# Patient Record
Sex: FEMALE | Employment: OTHER | URBAN - NONMETROPOLITAN AREA
[De-identification: names, ages, dates, MRNs, and addresses within clinical notes are randomized per-mention and may not be internally consistent; named-entity substitution may affect disease eponyms.]

---

## 2017-06-22 ENCOUNTER — ANTICOAGULATION THERAPY VISIT (OUTPATIENT)
Dept: ANTICOAGULATION | Facility: OTHER | Age: 77
End: 2017-06-22

## 2017-06-22 DIAGNOSIS — Z96.649 AFTERCARE FOLLOWING HIP JOINT REPLACEMENT SURGERY: Primary | ICD-10-CM

## 2017-06-22 DIAGNOSIS — Z79.01 LONG-TERM (CURRENT) USE OF ANTICOAGULANTS: ICD-10-CM

## 2017-06-22 DIAGNOSIS — Z47.1 AFTERCARE FOLLOWING HIP JOINT REPLACEMENT SURGERY: Primary | ICD-10-CM

## 2017-06-22 LAB — INR PPP: 1.9

## 2017-06-22 RX ORDER — ROPINIROLE 0.25 MG/1
1 TABLET, FILM COATED ORAL DAILY
COMMUNITY

## 2017-06-22 RX ORDER — FUROSEMIDE 20 MG
20 TABLET ORAL 2 TIMES DAILY
COMMUNITY

## 2017-06-22 RX ORDER — BUDESONIDE AND FORMOTEROL FUMARATE DIHYDRATE 160; 4.5 UG/1; UG/1
2 AEROSOL RESPIRATORY (INHALATION) 2 TIMES DAILY
COMMUNITY

## 2017-06-22 RX ORDER — WARFARIN SODIUM 2.5 MG/1
2.5 TABLET ORAL DAILY
COMMUNITY

## 2017-06-22 RX ORDER — FOSINOPRIL SODIUM 40 MG/1
40 TABLET ORAL DAILY
COMMUNITY

## 2017-06-22 RX ORDER — TRAMADOL HYDROCHLORIDE 50 MG/1
1 TABLET ORAL EVERY 6 HOURS PRN
COMMUNITY

## 2017-06-22 RX ORDER — ASPIRIN 81 MG/1
81 TABLET, CHEWABLE ORAL DAILY
COMMUNITY

## 2017-06-22 RX ORDER — SENNA AND DOCUSATE SODIUM 50; 8.6 MG/1; MG/1
1 TABLET, FILM COATED ORAL 2 TIMES DAILY
COMMUNITY

## 2017-06-22 RX ORDER — DIGOXIN 125 MCG
125 TABLET ORAL DAILY
COMMUNITY

## 2017-06-22 RX ORDER — CARVEDILOL 25 MG/1
25 TABLET ORAL 2 TIMES DAILY WITH MEALS
COMMUNITY

## 2017-06-22 RX ORDER — PRAVASTATIN SODIUM 10 MG
1 TABLET ORAL DAILY
COMMUNITY

## 2017-06-22 NOTE — PROGRESS NOTES
ANTICOAGULATION FOLLOW-UP CLINIC VISIT    Patient Name:  Saritha Montanez  Date:  6/22/2017  Contact Type:  INR done by [facility] staff and result faxed to warfarin clinic. New warfarin dosing and INR recheck date  faxed back to LT facility. Staff to notify warfarin clinic if patient has any bleeding/bruising, changes in diet/meds/activity or questions.    SUBJECTIVE:     Patient Findings     Comments Patient new to LT following hip replacement surgery. Per [facility] nurse, Dr. Rama Vernon is patient provider while she is in LT facility.            OBJECTIVE    INR   Date Value Ref Range Status   06/22/2017 1.9  Final       ASSESSMENT / PLAN  INR assessment THER    Recheck INR In: 1 WEEK    INR Location [facility]      Anticoagulation Summary as of 6/22/2017     INR goal 2.0-3.0   Today's INR 1.9!   Maintenance plan 5 mg (5 mg x 1) on Mon, Wed, Fri; 2.5 mg (5 mg x 0.5) all other days   Full instructions 5 mg on Mon, Wed, Fri; 2.5 mg all other days   Weekly total 25 mg   Plan last modified Marisol Simms RN (6/22/2017)   Next INR check 6/29/2017   Target end date     Indications   Aftercare following joint replacement [Z47.1] [Z47.1]  Long-term (current) use of anticoagulants [Z79.01] [Z79.01]         Anticoagulation Episode Summary     INR check location     Preferred lab     Send INR reminders to [facility] POOL    Comments       Anticoagulation Care Providers     Provider Role Specialty Phone number    Mark Ontiveros MD [facility] [phone]            See the Encounter Report to view Anticoagulation Flowsheet and Dosing Calendar (Go to Encounters tab in chart review, and find the Anticoagulation Therapy Visit)        Marisol Simms, RN

## 2017-06-22 NOTE — MR AVS SNAPSHOT
Saritha KNUTSON Lisseth   6/22/2017   Anticoagulation Therapy Visit    Description:  77 year old female   Provider:  Mark Ontiveros MD   Department:  [facility]           INR as of 6/22/2017     Today's INR 1.9!      Anticoagulation Summary as of 6/22/2017     INR goal 2.0-3.0   Today's INR 1.9!   Full instructions 5 mg on Mon, Wed, Fri; 2.5 mg all other days   Next INR check 6/29/2017    Indications   Aftercare following joint replacement [Z47.1] [Z47.1]  Long-term (current) use of anticoagulants [Z79.01] [Z79.01]         June 2017 Details    Sun Mon Tue Wed Thu Fri Sat         1               2               3                 4               5               6               7               8               9               10                 11               12               13               14               15               16               17                 18               19               20               21               22      2.5 mg   See details      23      5 mg         24      2.5 mg           25      2.5 mg         26      5 mg         27      2.5 mg         28      5 mg         29            30                 Date Details   06/22 This INR check       Date of next INR:  6/29/2017         How to take your warfarin dose     To take:  2.5 mg Take 0.5 of a 5 mg tablet.    To take:  5 mg Take 1 of the 5 mg tablets.

## 2017-06-29 ENCOUNTER — ANTICOAGULATION THERAPY VISIT (OUTPATIENT)
Dept: ANTICOAGULATION | Facility: OTHER | Age: 77
End: 2017-06-29

## 2017-06-29 DIAGNOSIS — Z79.01 LONG-TERM (CURRENT) USE OF ANTICOAGULANTS: ICD-10-CM

## 2017-06-29 LAB — INR PPP: 1.8

## 2017-06-29 NOTE — MR AVS SNAPSHOT
Saritha KNUTSON Lisseth   6/29/2017   Anticoagulation Therapy Visit    Description:  77 year old female   Provider:  Mark Ontiveros MD   Department:  [facility] Coagulation           INR as of 6/29/2017     Today's INR 1.8!      Anticoagulation Summary as of 6/29/2017     INR goal 2.0-3.0   Today's INR 1.8!   Full instructions 6/29: 5 mg; Otherwise 5 mg on Mon, Wed, Fri; 2.5 mg all other days   Next INR check 7/13/2017    Indications   Aftercare following joint replacement [Z47.1] [Z47.1]  Long-term (current) use of anticoagulants [Z79.01] [Z79.01]         June 2017 Details    Sun Mon Tue Wed Thu Fri Sat         1               2               3                 4               5               6               7               8               9               10                 11               12               13               14               15               16               17                 18               19               20               21               22               23               24                 25               26               27               28               29      5 mg   See details      30      5 mg           Date Details   06/29 This INR check               How to take your warfarin dose     To take:  5 mg Take 1 of the 5 mg tablets.           July 2017 Details    Sun Mon Tue Wed Thu Fri Sat           1      2.5 mg           2      2.5 mg         3      5 mg         4      2.5 mg         5      5 mg         6      2.5 mg         7      5 mg         8      2.5 mg           9      2.5 mg         10      5 mg         11      2.5 mg         12      5 mg         13            14               15                 16               17               18               19               20               21               22                 23               24               25               26               27               28               29                 30               31                     Date  Details   No additional details    Date of next INR:  7/13/2017         How to take your warfarin dose     To take:  2.5 mg Take 0.5 of a 5 mg tablet.    To take:  5 mg Take 1 of the 5 mg tablets.

## 2017-06-29 NOTE — PROGRESS NOTES
ANTICOAGULATION FOLLOW-UP CLINIC VISIT    Patient Name:  Saritha Montanez  Date:  6/29/2017  Contact Type:  INR done by [facility] staff and result faxed to warfarin clinic. New warfarin dosing and INR recheck date faxed back to LT facility. Staff to notify warfarin clinic if patient has any bleeding/bruising, changes in diet/meds/activity or if discharge plans change.    SUBJECTIVE:     Patient Findings     Comments Patient being discharged from LTC facility on 7/1/17.  Unknown who her primary care physician is. Dr. Ontiveros was PCP in nursing home but not after discharge. Unknown how long patient to be on warfarin post surgery as none of that information was sent to warfarin clinic. Will check with [facility] to see what plans are after discharge for warfarin management.            OBJECTIVE    INR   Date Value Ref Range Status   06/29/2017 1.8  Final       ASSESSMENT / PLAN  INR assessment SUB    Recheck INR In: 2 WEEKS    INR Location Clinic      Anticoagulation Summary as of 6/29/2017     INR goal 2.0-3.0   Today's INR 1.8!   Maintenance plan 5 mg (5 mg x 1) on Mon, Wed, Fri; 2.5 mg (5 mg x 0.5) all other days   Full instructions 5 mg on Mon, Wed, Fri; 2.5 mg all other days   Weekly total 25 mg   Plan last modified Marisol Simms, RN (6/22/2017)   Next INR check 7/13/2017   Target end date     Indications   Aftercare following joint replacement [Z47.1] [Z47.1]  Long-term (current) use of anticoagulants [Z79.01] [Z79.01]         Anticoagulation Episode Summary     INR check location     Preferred lab     Send INR reminders to  [facility] POOL    Comments       Anticoagulation Care Providers     Provider Role Specialty Phone number    Mark Ontiveros MD [facility] [phone]            See the Encounter Report to view Anticoagulation Flowsheet and Dosing Calendar (Go to Encounters tab in chart review, and find the Anticoagulation Therapy Visit)        Marisol Simms, RN

## 2017-07-27 ENCOUNTER — ANTICOAGULATION THERAPY VISIT (OUTPATIENT)
Dept: ANTICOAGULATION | Facility: OTHER | Age: 77
End: 2017-07-27

## 2017-07-27 DIAGNOSIS — Z79.01 LONG-TERM (CURRENT) USE OF ANTICOAGULANTS: ICD-10-CM

## 2017-07-27 NOTE — MR AVS SNAPSHOT
Saritha Montanez   7/27/2017   Anticoagulation Therapy Visit    Description:  77 year old female   Provider:  Mark Ontiveros MD   Department:  [facility]           INR as of 7/27/2017     Today's INR       Anticoagulation Summary as of 7/27/2017     INR goal 2.0-3.0   Today's INR    Full instructions 5 mg on Mon, Wed, Fri; 2.5 mg all other days   Next INR check     Indications   Aftercare following joint replacement [Z47.1] [Z47.1]  Long-term (current) use of anticoagulants [Z79.01] [Z79.01]         Anticoagulation Episode Summary     Resolved date 7/27/2017    Resolved reason Outside MD

## 2017-07-27 NOTE — PROGRESS NOTES
ANTICOAGULATION FOLLOW-UP CLINIC VISIT    Patient Name:  Saritha Montanez  Date:  7/27/2017  Contact Type:  Telephone    SUBJECTIVE:     Patient Findings     Comments Patient returned call to this warfarin clinic. She states that her INR's and warfarin dosing is being managed by [facility] cardiology since discharge from [facility] facility. She will be discharged from this warfarin clinic           OBJECTIVE    INR   Date Value Ref Range Status   06/29/2017 1.8  Final       ASSESSMENT / PLAN  No question data found.  Anticoagulation Summary as of 7/27/2017     INR goal 2.0-3.0   Today's INR    Maintenance plan 5 mg (5 mg x 1) on Mon, Wed, Fri; 2.5 mg (5 mg x 0.5) all other days   Full instructions 5 mg on Mon, Wed, Fri; 2.5 mg all other days   Weekly total 25 mg   Plan last modified Marisol Simms RN (6/22/2017)   Next INR check    Target end date     Indications   Aftercare following joint replacement [Z47.1] [Z47.1]  Long-term (current) use of anticoagulants [Z79.01] [Z79.01]         Anticoagulation Episode Summary     INR check location     Preferred lab     Resolved date 7/27/2017    Resolved reason Outside MD    Send INR reminders to [facility] POOL    Comments       Anticoagulation Care Providers     Provider Role Specialty Phone number    Mark Ontiveros MD [facility] [phone]            See the Encounter Report to view Anticoagulation Flowsheet and Dosing Calendar (Go to Encounters tab in chart review, and find the Anticoagulation Therapy Visit)        Marisol Simms, RN

## 2018-08-06 ENCOUNTER — MEDICAL CORRESPONDENCE (OUTPATIENT)
Dept: HEALTH INFORMATION MANAGEMENT | Facility: CLINIC | Age: 78
End: 2018-08-06

## 2018-08-20 ENCOUNTER — OFFICE VISIT (OUTPATIENT)
Dept: SLEEP MEDICINE | Facility: HOSPITAL | Age: 78
End: 2018-08-20
Attending: PHYSICIAN ASSISTANT
Payer: MEDICARE

## 2018-08-20 NOTE — PROGRESS NOTES
Pt in to assess response to a conserving Oxygen regulator.  Patient currently uses 2lpm/O2.  SpO2-96%-2lpm at rest.  Patient was changed to the conserving regulator at 2lpm and was able to trigger.  Patient ambulated 100ft with SpO2 remaining >92%, respiratory rate increased from 16-20 with activity.  Heart rate remained at 70.  Patient was able to carry device and tolerate.

## 2019-06-17 PROBLEM — Z79.01 LONG TERM CURRENT USE OF ANTICOAGULANT THERAPY: Status: ACTIVE | Noted: 2017-06-22
